# Patient Record
Sex: MALE | Race: WHITE | NOT HISPANIC OR LATINO | Employment: FULL TIME | URBAN - NONMETROPOLITAN AREA
[De-identification: names, ages, dates, MRNs, and addresses within clinical notes are randomized per-mention and may not be internally consistent; named-entity substitution may affect disease eponyms.]

---

## 2019-04-26 ENCOUNTER — NON-PROVIDER VISIT (OUTPATIENT)
Dept: URGENT CARE | Facility: PHYSICIAN GROUP | Age: 34
End: 2019-04-26

## 2019-04-26 DIAGNOSIS — Z02.1 PRE-EMPLOYMENT DRUG SCREENING: ICD-10-CM

## 2019-04-26 LAB
AMP AMPHETAMINE: NORMAL
COC COCAINE: NORMAL
INT CON NEG: NEGATIVE
INT CON POS: POSITIVE
MET METHAMPHETAMINES: NORMAL
OPI OPIATES: NORMAL
PCP PHENCYCLIDINE: NORMAL
POC DRUG COMMENT 753798-OCCUPATIONAL HEALTH: NEGATIVE
THC: NORMAL

## 2019-04-26 PROCEDURE — 80305 DRUG TEST PRSMV DIR OPT OBS: CPT | Performed by: PHYSICIAN ASSISTANT

## 2019-11-14 ENCOUNTER — NON-PROVIDER VISIT (OUTPATIENT)
Dept: URGENT CARE | Facility: PHYSICIAN GROUP | Age: 34
End: 2019-11-14

## 2019-11-14 DIAGNOSIS — Z02.1 PRE-EMPLOYMENT DRUG SCREENING: Primary | ICD-10-CM

## 2019-11-14 LAB
AMP AMPHETAMINE: NORMAL
BAR BARBITURATES: NORMAL
BREATH ALCOHOL COMMENT: NEGATIVE
BZO BENZODIAZEPINES: NORMAL
COC COCAINE: NORMAL
INT CON NEG: NORMAL
INT CON POS: NORMAL
MDMA ECSTASY: NORMAL
MET METHAMPHETAMINES: NORMAL
MTD METHADONE: NORMAL
OPI OPIATES: NORMAL
OXY OXYCODONE: NORMAL
PCP PHENCYCLIDINE: NORMAL
POC BREATHALIZER: 0 PERCENT (ref 0–0.01)
POC URINE DRUG SCREEN OCDRS: NORMAL
THC: NORMAL

## 2019-11-14 PROCEDURE — 82075 ASSAY OF BREATH ETHANOL: CPT | Performed by: PHYSICIAN ASSISTANT

## 2019-11-14 PROCEDURE — 80305 DRUG TEST PRSMV DIR OPT OBS: CPT | Performed by: PHYSICIAN ASSISTANT

## 2024-07-11 ENCOUNTER — HOSPITAL ENCOUNTER (EMERGENCY)
Facility: HOSPITAL | Age: 39
Discharge: HOME | End: 2024-07-11
Attending: EMERGENCY MEDICINE
Payer: COMMERCIAL

## 2024-07-11 VITALS
OXYGEN SATURATION: 99 % | HEART RATE: 74 BPM | RESPIRATION RATE: 18 BRPM | WEIGHT: 271 LBS | DIASTOLIC BLOOD PRESSURE: 86 MMHG | HEIGHT: 66 IN | SYSTOLIC BLOOD PRESSURE: 155 MMHG | TEMPERATURE: 98 F | BODY MASS INDEX: 43.55 KG/M2

## 2024-07-11 DIAGNOSIS — M79.604 LEG PAIN, ANTERIOR, RIGHT: ICD-10-CM

## 2024-07-11 DIAGNOSIS — R42 DIZZINESS: ICD-10-CM

## 2024-07-11 DIAGNOSIS — R53.83 FATIGUE, UNSPECIFIED TYPE: Primary | ICD-10-CM

## 2024-07-11 DIAGNOSIS — R06.02 SOB (SHORTNESS OF BREATH): ICD-10-CM

## 2024-07-11 DIAGNOSIS — N30.01 ACUTE CYSTITIS WITH HEMATURIA: ICD-10-CM

## 2024-07-11 LAB
ALBUMIN SERPL BCP-MCNC: 4.9 G/DL (ref 3.5–5.2)
ALP SERPL-CCNC: 43 U/L (ref 55–135)
ALT SERPL W/O P-5'-P-CCNC: 44 U/L (ref 10–44)
ANION GAP SERPL CALC-SCNC: 13 MMOL/L (ref 8–16)
AST SERPL-CCNC: 24 U/L (ref 10–40)
BACTERIA #/AREA URNS HPF: ABNORMAL /HPF
BASOPHILS # BLD AUTO: 0.04 K/UL (ref 0–0.2)
BASOPHILS NFR BLD: 0.5 % (ref 0–1.9)
BILIRUB SERPL-MCNC: 1.2 MG/DL (ref 0.1–1)
BILIRUB UR QL STRIP: NEGATIVE
BNP SERPL-MCNC: <10 PG/ML (ref 0–99)
BUN SERPL-MCNC: 19 MG/DL (ref 6–20)
CALCIUM SERPL-MCNC: 10 MG/DL (ref 8.7–10.5)
CHLORIDE SERPL-SCNC: 104 MMOL/L (ref 95–110)
CK SERPL-CCNC: 244 U/L (ref 20–200)
CLARITY UR: ABNORMAL
CO2 SERPL-SCNC: 20 MMOL/L (ref 23–29)
COLOR UR: YELLOW
CREAT SERPL-MCNC: 1.2 MG/DL (ref 0.5–1.4)
DIFFERENTIAL METHOD BLD: ABNORMAL
EOSINOPHIL # BLD AUTO: 0.1 K/UL (ref 0–0.5)
EOSINOPHIL NFR BLD: 1.3 % (ref 0–8)
ERYTHROCYTE [DISTWIDTH] IN BLOOD BY AUTOMATED COUNT: 12 % (ref 11.5–14.5)
EST. GFR  (NO RACE VARIABLE): >60 ML/MIN/1.73 M^2
GLUCOSE SERPL-MCNC: 99 MG/DL (ref 70–110)
GLUCOSE UR QL STRIP: NEGATIVE
HCT VFR BLD AUTO: 43.1 % (ref 40–54)
HGB BLD-MCNC: 15.7 G/DL (ref 14–18)
HGB UR QL STRIP: NEGATIVE
HYALINE CASTS #/AREA URNS LPF: 19 /LPF
IMM GRANULOCYTES # BLD AUTO: 0.02 K/UL (ref 0–0.04)
IMM GRANULOCYTES NFR BLD AUTO: 0.3 % (ref 0–0.5)
KETONES UR QL STRIP: ABNORMAL
LEUKOCYTE ESTERASE UR QL STRIP: NEGATIVE
LYMPHOCYTES # BLD AUTO: 2.1 K/UL (ref 1–4.8)
LYMPHOCYTES NFR BLD: 27.4 % (ref 18–48)
MAGNESIUM SERPL-MCNC: 2.1 MG/DL (ref 1.6–2.6)
MCH RBC QN AUTO: 30.4 PG (ref 27–31)
MCHC RBC AUTO-ENTMCNC: 36.4 G/DL (ref 32–36)
MCV RBC AUTO: 83 FL (ref 82–98)
MICROSCOPIC COMMENT: ABNORMAL
MONOCYTES # BLD AUTO: 0.4 K/UL (ref 0.3–1)
MONOCYTES NFR BLD: 5 % (ref 4–15)
NEUTROPHILS # BLD AUTO: 5.1 K/UL (ref 1.8–7.7)
NEUTROPHILS NFR BLD: 65.5 % (ref 38–73)
NITRITE UR QL STRIP: NEGATIVE
NRBC BLD-RTO: 0 /100 WBC
PH UR STRIP: 6 [PH] (ref 5–8)
PLATELET # BLD AUTO: 309 K/UL (ref 150–450)
PMV BLD AUTO: 9.5 FL (ref 9.2–12.9)
POCT GLUCOSE: 107 MG/DL (ref 70–110)
POCT GLUCOSE: 99 MG/DL (ref 70–110)
POTASSIUM SERPL-SCNC: 3.9 MMOL/L (ref 3.5–5.1)
PROT SERPL-MCNC: 8.5 G/DL (ref 6–8.4)
PROT UR QL STRIP: ABNORMAL
RBC # BLD AUTO: 5.17 M/UL (ref 4.6–6.2)
RBC #/AREA URNS HPF: 5 /HPF (ref 0–4)
SODIUM SERPL-SCNC: 137 MMOL/L (ref 136–145)
SP GR UR STRIP: 1.03 (ref 1–1.03)
TROPONIN I SERPL DL<=0.01 NG/ML-MCNC: 0.01 NG/ML (ref 0–0.03)
URN SPEC COLLECT METH UR: ABNORMAL
UROBILINOGEN UR STRIP-ACNC: NEGATIVE EU/DL
WBC # BLD AUTO: 7.75 K/UL (ref 3.9–12.7)
WBC #/AREA URNS HPF: 7 /HPF (ref 0–5)

## 2024-07-11 PROCEDURE — 99285 EMERGENCY DEPT VISIT HI MDM: CPT | Mod: 25

## 2024-07-11 PROCEDURE — 63600175 PHARM REV CODE 636 W HCPCS

## 2024-07-11 PROCEDURE — 83036 HEMOGLOBIN GLYCOSYLATED A1C: CPT | Performed by: PHYSICIAN ASSISTANT

## 2024-07-11 PROCEDURE — 82550 ASSAY OF CK (CPK): CPT | Performed by: PHYSICIAN ASSISTANT

## 2024-07-11 PROCEDURE — 85025 COMPLETE CBC W/AUTO DIFF WBC: CPT | Performed by: PHYSICIAN ASSISTANT

## 2024-07-11 PROCEDURE — 83735 ASSAY OF MAGNESIUM: CPT | Performed by: PHYSICIAN ASSISTANT

## 2024-07-11 PROCEDURE — 80053 COMPREHEN METABOLIC PANEL: CPT | Performed by: PHYSICIAN ASSISTANT

## 2024-07-11 PROCEDURE — 96361 HYDRATE IV INFUSION ADD-ON: CPT

## 2024-07-11 PROCEDURE — 96374 THER/PROPH/DIAG INJ IV PUSH: CPT

## 2024-07-11 PROCEDURE — 82962 GLUCOSE BLOOD TEST: CPT

## 2024-07-11 PROCEDURE — 93005 ELECTROCARDIOGRAM TRACING: CPT

## 2024-07-11 PROCEDURE — 83880 ASSAY OF NATRIURETIC PEPTIDE: CPT | Performed by: PHYSICIAN ASSISTANT

## 2024-07-11 PROCEDURE — 84484 ASSAY OF TROPONIN QUANT: CPT | Performed by: PHYSICIAN ASSISTANT

## 2024-07-11 PROCEDURE — 81000 URINALYSIS NONAUTO W/SCOPE: CPT | Performed by: PHYSICIAN ASSISTANT

## 2024-07-11 PROCEDURE — 93010 ELECTROCARDIOGRAM REPORT: CPT | Mod: ,,, | Performed by: INTERNAL MEDICINE

## 2024-07-11 PROCEDURE — 25000003 PHARM REV CODE 250

## 2024-07-11 RX ORDER — ONDANSETRON 4 MG/1
4 TABLET, ORALLY DISINTEGRATING ORAL EVERY 8 HOURS PRN
Qty: 15 TABLET | Refills: 0 | Status: SHIPPED | OUTPATIENT
Start: 2024-07-11

## 2024-07-11 RX ORDER — CEPHALEXIN 500 MG/1
500 CAPSULE ORAL 4 TIMES DAILY
Qty: 28 CAPSULE | Refills: 0 | Status: SHIPPED | OUTPATIENT
Start: 2024-07-11 | End: 2024-07-18

## 2024-07-11 RX ORDER — DICLOFENAC SODIUM 75 MG/1
1 TABLET, DELAYED RELEASE ORAL 2 TIMES DAILY
COMMUNITY
Start: 2024-07-10 | End: 2025-07-10

## 2024-07-11 RX ORDER — ONDANSETRON HYDROCHLORIDE 2 MG/ML
4 INJECTION, SOLUTION INTRAVENOUS
Status: COMPLETED | OUTPATIENT
Start: 2024-07-11 | End: 2024-07-11

## 2024-07-11 RX ADMIN — SODIUM CHLORIDE 1000 ML: 9 INJECTION, SOLUTION INTRAVENOUS at 04:07

## 2024-07-11 RX ADMIN — ONDANSETRON 4 MG: 2 INJECTION INTRAMUSCULAR; INTRAVENOUS at 04:07

## 2024-07-11 NOTE — ED TRIAGE NOTES
"Pt to ED for evaluation of near syncopal episode that occurred earlier today while at work. Pt reports that he has had several episodes of dizziness but "never this bad". Pt reports that he has been seen and assessed for symptoms several time in the last month "and everything comes back fine". Pt also c/o headaches and nausea that has since resolved. No Pmhx. Pt is AAOx4, ambulatory w/ a steady gait and NADN.   "

## 2024-07-11 NOTE — Clinical Note
"Sonido Brownlatha Post was seen and treated in our emergency department on 7/11/2024.  He may return to work on 07/15/2024.  May return sooner if symptoms resolving or otherwise stated by PCP     If you have any questions or concerns, please don't hesitate to call.      Brigette Saha PA-C"

## 2024-07-11 NOTE — DISCHARGE INSTRUCTIONS
Be sure to complete all antibiotics as prescribed.  Be sure urinating adequate rest and drink plenty of fluids to stay hydrated.    Follow up at your scheduled in primary care and cardiology appointment tomorrow.    Please return to ER if you experience severe dizziness, fever higher then 100.4 that persist after medication administration, uncontrolled nausea/vomiting or diarrhea or any other major concern like increased pain, chest pain, shortness of breath, inability to pass stool or gas, or difficulty breathing or swelling of the throat/mouth/tongue.    Be sure to follow up with your primary care doctor and review all labs/imaging/tests that were performed during your ER visit with them. It is very common for us to identify non-emergent incidental findings which must be followed up with your primary care physician.  Some labs/imaging/tests may be outside of the normal range, and require non-emergent follow-up and/or further investigation/treatment/procedures/testing to help diagnose/exclude/prevent complications or other potentially serious medical conditions. Some abnormalities may not have been discussed or addressed during your ER visit.

## 2024-07-11 NOTE — ED PROVIDER NOTES
"Encounter Date: 7/11/2024    SCRIBE #1 NOTE: I, Liberty Owenes, am scribing for, and in the presence of,  Brigette Saha PA-C. I have scribed the following portions of the note - Other sections scribed: HPI, ROS, PE.       History     Chief Complaint   Patient presents with    Dizziness     Pt working in heat c/o lightheadedness, dizziness, SOB and headaches. Pt states "I felt like I was catching a heat stroke" Pt AAO x4 at present. RR even and unlabored.      39 y.o. male with PMHx of diabetes mellitus type 2, presents for emergent evaluation of near syncopal episode that occurred while at work earlier today. Patient reports he has been experiencing headaches, dizziness, lightheadedness, and fatigue for the past 4-5 days with symptoms abruptly worsened today while were hand side prompting him to report to the ER. Patient also endorses having SOB, palpitations, and a tingling sensation throughout his chest and bilateral upper extremities at that time but is now resolving. Currently, patient reports intermittent nausea but denies any emesis, diarrhea or decreased urine output. Patient has not taken any medications at this time. Patient states he used to take Metformin but stopped on his own accord a few years ago after making diet changes and losing weight.  [place] has not followed up with a PCP.  No other daily medications. Patient reports history of drug use (cocaine, quit years ago) and marijuana (quit 4 months ago). Patient denies fever/chills, abdominal pain, vomiting/diarrhea, urinary concerns or any other complaints at this time.    Patient reports history of similar symptoms in the past but "never this bad". Patient reports having similar symptoms a month ago that began while working outside in the heat. Per chart review, patient [facility] for elevated blood pressure, palpitations, shortness of breath, nausea, confusion. EKG demonstrated no ischemia or arrhythmia. " Chest x-ray was unremarkable. Troponins were  x 2. CMP with minimally elevated glucose and exceedingly mild hypokalemia. CBC was normal. Patient was discharged home and advised for a followup with his PCP to monitor blood pressure. Patient states since his first ED visit, he did not experience similar symptoms until 5 days ago. He also reports a mass to his right shin that occurred after his leg was caught on the bumper 1 week ago but has since noticed increased swelling to the area.  Patient was seen at [facility] yesterday where he received in x-ray without any abnormalities was advised to follow up with ortho.  He is unsure if this injury may be contributing to his current symptoms.  Does report follow up with Cardiology tomorrow.    The history is provided by the patient and medical records. No  was used.     Review of patient's allergies indicates:  No Known Allergies  No past medical history on file.  No past surgical history on file.  No family history on file.     Review of Systems   Constitutional:  Positive for fatigue. Negative for chills and fever.   HENT:  Negative for sore throat.    Eyes:  Negative for photophobia, pain and visual disturbance.   Respiratory:  Positive for shortness of breath.    Cardiovascular:  Positive for palpitations and leg swelling (R shin). Negative for chest pain.   Gastrointestinal:  Positive for nausea. Negative for abdominal pain, diarrhea and vomiting.   Genitourinary:  Negative for decreased urine volume, dysuria, hematuria and testicular pain.   Musculoskeletal:  Negative for myalgias.   Skin:  Negative for rash.   Neurological:  Positive for dizziness, light-headedness, numbness (tingling to chest and bilateral upper extremities) and headaches. Negative for syncope and weakness.        (+) near syncope   Psychiatric/Behavioral:  Positive for decreased concentration.        Physical Exam     Initial Vitals [07/11/24 1453]   BP Pulse Resp Temp SpO2   (!)  155/86 74 18 98.2 °F (36.8 °C) 99 %      MAP       --         Physical Exam    Nursing note and vitals reviewed.  Constitutional: He appears well-developed and well-nourished. He is not diaphoretic. No distress.   HENT:   Head: Normocephalic and atraumatic.   Right Ear: External ear normal.   Left Ear: External ear normal.   Mouth/Throat: Oropharynx is clear and moist.   Eyes: Conjunctivae and EOM are normal. Pupils are equal, round, and reactive to light. No scleral icterus.   Neck: Neck supple.   Normal range of motion.  Cardiovascular:  Normal rate, regular rhythm and normal heart sounds.           Pulmonary/Chest: Breath sounds normal. No stridor. No respiratory distress. He has no wheezes. He has no rhonchi. He has no rales.   Abdominal: Abdomen is soft. Bowel sounds are normal. He exhibits no distension. There is no abdominal tenderness.   No suprapubic tenderness, no flank pain, no CVAT  There is no rebound and no guarding.   Musculoskeletal:         General: Normal range of motion.      Cervical back: Normal range of motion and neck supple.      Right lower leg: No tenderness (R inner calf).      Comments: 5/5 strength in all extremities with no decrease in sensation.  Patient has full range of motion of all extremities.  2+ radial pulses.  2+ dorsalis pedis pulse.  2+ posterior tibial pulse.  Ambulating without difficulty. 4 cm palpable mass noted to medial aspect of right lower extremity that is nontender to palpation.  No bruising, wounds, skin abnormalities.  Distal sensation intact.  2+ pedal pulse.  Strong pulses on Doppler.      Lymphadenopathy:     He has no cervical adenopathy.   Neurological: He is alert and oriented to person, place, and time. He has normal strength. No cranial nerve deficit or sensory deficit. GCS score is 15. GCS eye subscore is 4. GCS verbal subscore is 5. GCS motor subscore is 6.   PERRL, EOMI w/out evidence of nystagmus, No deficits appreciated to light touch bilateral face,  No facial weakness, no facial asymmetry. Eyebrow raise symmetric. Smile symmetric, Palate midline, and raises symmetrically, Shoulder shrug 5/5 bilaterally, tongue is midline w/out asymmetry. Strength 5/5 to bilateral upper and lower extremities, sensation intact to light touch     Skin: Skin is warm. Abrasion noted. No rash noted.   Well-healed abrasion to R inner calf with palpable mass. No tenderness with palpation.    Psychiatric: He has a normal mood and affect. Thought content normal.         ED Course   Procedures  Labs Reviewed   CBC W/ AUTO DIFFERENTIAL - Abnormal; Notable for the following components:       Result Value    MCHC 36.4 (*)     All other components within normal limits   COMPREHENSIVE METABOLIC PANEL - Abnormal; Notable for the following components:    CO2 20 (*)     Total Protein 8.5 (*)     Total Bilirubin 1.2 (*)     Alkaline Phosphatase 43 (*)     All other components within normal limits   CK - Abnormal; Notable for the following components:     (*)     All other components within normal limits   URINALYSIS, REFLEX TO URINE CULTURE - Abnormal; Notable for the following components:    Appearance, UA Hazy (*)     Protein, UA 1+ (*)     Ketones, UA 1+ (*)     All other components within normal limits    Narrative:     Specimen Source->Urine   URINALYSIS MICROSCOPIC - Abnormal; Notable for the following components:    RBC, UA 5 (*)     WBC, UA 7 (*)     Hyaline Casts, UA 19 (*)     All other components within normal limits    Narrative:     Specimen Source->Urine   B-TYPE NATRIURETIC PEPTIDE   TROPONIN I   HEMOGLOBIN A1C   MAGNESIUM   HEMOGLOBIN A1C   MAGNESIUM   TROPONIN I   POCT GLUCOSE   POCT GLUCOSE        ECG Results              EKG 12-lead (Final result)  Result time 07/12/24 11:10:09      Final result by Unknown User (07/12/24 11:10:09)                                      Imaging Results              US Lower Extremity Veins Right (Final result)  Result time 07/11/24 18:02:38       Final result by Jassi Frederick MD (07/11/24 18:02:38)                   Impression:      No evidence of right lower extremity deep venous thrombosis.      Electronically signed by: Jassi Frederick MD  Date:    07/11/2024  Time:    18:02               Narrative:    EXAMINATION:  US LOWER EXTREMITY VEINS RIGHT    CLINICAL HISTORY:  Pain in unspecified lower leg    TECHNIQUE:  Duplex and color flow Doppler evaluation of the right lower extremity veins was performed.    COMPARISON:  None    FINDINGS:  No evidence of clot involving the bilateral common femoral veins or right greater saphenous, femoral, popliteal, peroneal, anterior and posterior tibial veins.  All venous structures demonstrate normal respiratory phasicity and augment adequately.  No evidence of soft tissue mass or Baker's cyst.                                       X-Ray Tibia Fibula 2 View Right (Final result)  Result time 07/11/24 17:07:13      Final result by Henry Gómez MD (07/11/24 17:07:13)                   Impression:      No acute radiographic abnormality.      Electronically signed by: Henry Gómez  Date:    07/11/2024  Time:    17:07               Narrative:    EXAMINATION:  XR TIBIA FIBULA 2 VIEW RIGHT    CLINICAL HISTORY:  Injury, unspecified, initial encounter    TECHNIQUE:  AP and lateral views of the right tibia and fibula were performed.    COMPARISON:  None.    FINDINGS:  No acute fracture, subluxation or dislocation.  No mass or foreign body.  No significant arthropathy.                                       X-Ray Chest PA And Lateral (Final result)  Result time 07/11/24 15:37:46      Final result by Collin Walker MD (07/11/24 15:37:46)                   Impression:      No acute cardiopulmonary abnormality.      Electronically signed by: Collin Walker  Date:    07/11/2024  Time:    15:37               Narrative:    EXAMINATION:  XR CHEST PA AND LATERAL    CLINICAL HISTORY:  Shortness of  breath    TECHNIQUE:  PA and lateral views of the chest were performed.    COMPARISON:  None    FINDINGS:  Cardiomediastinal silhouette within normal limits.  No confluent airspace opacity or lobar consolidation.  No pleural effusion or gross pneumothorax.  No acute osseous abnormality                                       Medications   sodium chloride 0.9% bolus 1,000 mL 1,000 mL (0 mLs Intravenous Stopped 7/11/24 1740)   ondansetron injection 4 mg (4 mg Intravenous Given 7/11/24 1645)     Medical Decision Making  This is an evaluation of a 39 y.o. male that presents to the Emergency Department for near syncopal episode that occurred while at work earlier today. Patient reports he has been experiencing headaches, dizziness, lightheadedness, and fatigue for the past 4-5 days.  He reports similar episode 1 month ago and was evaluated in the ER with unremarkable workup.  Follow up with Cardiology scheduled for tomorrow.    Vitals stable. Nontoxic appearing.  No respiratory distress.    See above for full physical exam findings.    Patient's chart and medical history reviewed.  reviewed.    Differential Diagnosis includes, but is not limited to:  Arrhythmia, aortic dissection, MI/unstable angina, PE, cardiogenic shock, CHF, CVA/TIA, intracranial lesion/mass, seizure, perforated viscous, ruptured AAA, orthostatic hypotension, vasovagal episode, anemia, dehydration, medication reaction, intentional overdose     ED MEDS GIVEN:  Medications  sodium chloride 0.9% bolus 1,000 mL 1,000 mL (0 mLs Intravenous Stopped 7/11/24 1740)  ondansetron injection 4 mg (4 mg Intravenous Given 7/11/24 1645)    Clinical Impression:  Final diagnoses:  [R06.02] SOB (shortness of breath)  [R42] Dizziness  [M79.604] Leg pain, anterior, right  [N30.01] Acute cystitis with hematuria  [R53.83] Fatigue, unspecified type (Primary)    Clinical picture today most consistent with the impression above.  See ED course.  EKG unremarkable for any  ischemic changes.  No leukocytosis. H/H stable.  No notable electrolyte abnormalities.  UA with 7+ white blood cells and rare bacteria.  Patient is not circumcised so this could be just contamination.  Urine culture pending.  Mild elevation CPK but no indication of rhabdomyolysis.  No elevations in troponin or BNP.  U/S lower extremity patent with no evidence of DVT.  Patient states he is feeling much better after IV rehydration and Zofran.  Repeat abdominal exam benign.  Tolerating PO without difficulty.  Neuro intact.  Ambulating without difficulty.    We will discharge with short course of antibiotics while urine culture pending.  Emphasized the importance of adequate rest and oral hydration.  I see no indication of an emergent process beyond that addressed during our encounter but have duly counseled the patient/family regarding the need for prompt follow-up as well as the indications that should prompt immediate return to the emergency room should new or worrisome developments occur.  The patient/family has been provided with verbal and printed direction regarding our final diagnosis(es) as well as instructions regarding use of OTC and/or Rx medications intended to manage the patient's conditions. The patient/family communicates understanding of all this information and all remaining questions and concerns were addressed at this time.    DISCLAIMER: This note was prepared with [employer]ijector voice recognition transcription software. Garbled syntax, mangled pronouns, and other bizarre constructions may be attributed to that software system.     Amount and/or Complexity of Data Reviewed  External Data Reviewed: labs, radiology and notes.     Details: See HPI.   Labs: ordered. Decision-making details documented in ED Course.  Radiology: ordered. Decision-making details documented in ED Course.  ECG/medicine tests: ordered. Decision-making details documented in ED Course.    Risk  OTC drugs.  Prescription drug  management.  Diagnosis or treatment significantly limited by social determinants of health.            Scribe Attestation:   Scribe #1: I performed the above scribed service and the documentation accurately describes the services I performed. I attest to the accuracy of the note.        ED Course as of 07/12/24 2028   Thu Jul 11, 2024   1501 EKG 12-lead  No STEMI. [LP]   1728 POCT Glucose: 99 [CC]   1728 CBC auto differential(!)  CBC without significant anemia, thrombocytopenia, or leukocytosis. [CC]   1728 Comprehensive metabolic panel(!)  CMP unremarkable without significant electrolyte derangement, impaired renal function, or elevated LFTs.  [CC]   1729 BILIRUBIN TOTAL(!): 1.2 [CC]   1729 CPK(!): 244  Rhabdomyolysis unlikely [CC]   1729 BNP: <10 [CC]   1729 Magnesium : 2.1 [CC]   1729 Troponin I: 0.008 [CC]   1729 Urinalysis Microscopic(!) [CC]   1729 WBC, UA(!): 7  Patient not circumcised.  Could be contamination [CC]   1729 RBC, UA(!): 5  Likely secondary to dehydration [CC]   1730 X-Ray Chest PA And Lateral  Chest x-ray was unremarkable per my personal interpretation. No lobar consolidation, large pneumothorax, or mediastinal widening.  See note for official interpretation per radiologist. [CC]   1731 X-Ray Tibia Fibula 2 View Right  No acute fracture, subluxation or dislocation.  No mass or foreign body.  No significant arthropathy. [CC]   1812 US Lower Extremity Veins Right  No evidence of clot involving the bilateral common femoral veins or right greater saphenous, femoral, popliteal, peroneal, anterior and posterior tibial veins. [CC]      ED Course User Index  [CC] Brigette Saha PA-C  [LP] Se Arauz III, MD                         I, Brigette Saha PA-C, personally performed the services described in this documentation. All medical record entries made by the scribe were at my direction and in my presence. I have reviewed the chart and agree that the record reflects my personal performance and  is accurate and complete.    Clinical Impression:  Final diagnoses:  [R06.02] SOB (shortness of breath)  [R42] Dizziness  [M79.604] Leg pain, anterior, right  [N30.01] Acute cystitis with hematuria  [R53.83] Fatigue, unspecified type (Primary)          ED Disposition Condition    Discharge Stable          ED Prescriptions       Medication Sig Dispense Start Date End Date Auth. Provider    cephALEXin (KEFLEX) 500 MG capsule Take 1 capsule (500 mg total) by mouth 4 (four) times daily. for 7 days 28 capsule 7/11/2024 7/18/2024 Brigette Saha PA-C    ondansetron (ZOFRAN-ODT) 4 MG TbDL Take 1 tablet (4 mg total) by mouth every 8 (eight) hours as needed (nausea). 15 tablet 7/11/2024 -- Brigette Saha PA-C          Follow-up Information       Follow up With Specialties Details Why Contact Info    [facility] - Emergency Dept Emergency Medicine Go to  For new or worsening symptoms [address] [facility] [place] [id number] [phone]    [facility] -  Schedule an appointment as soon as possible for a visit   [address] [place] [address]  [phone]               Brigette Saha PA-C  07/12/24 2028

## 2024-07-12 LAB
ESTIMATED AVG GLUCOSE: 114 MG/DL (ref 68–131)
HBA1C MFR BLD: 5.6 % (ref 4–5.6)
OHS QRS DURATION: 96 MS
OHS QTC CALCULATION: 409 MS

## 2024-12-02 ENCOUNTER — OFFICE VISIT (OUTPATIENT)
Dept: FAMILY MEDICINE | Facility: CLINIC | Age: 39
End: 2024-12-02
Payer: COMMERCIAL

## 2024-12-02 VITALS
OXYGEN SATURATION: 97 % | TEMPERATURE: 98 F | HEART RATE: 77 BPM | DIASTOLIC BLOOD PRESSURE: 75 MMHG | WEIGHT: 277.75 LBS | SYSTOLIC BLOOD PRESSURE: 130 MMHG | BODY MASS INDEX: 44.64 KG/M2 | HEIGHT: 66 IN

## 2024-12-02 DIAGNOSIS — M54.50 ACUTE LOW BACK PAIN WITHOUT SCIATICA, UNSPECIFIED BACK PAIN LATERALITY: ICD-10-CM

## 2024-12-02 DIAGNOSIS — M54.2 NECK PAIN: ICD-10-CM

## 2024-12-02 DIAGNOSIS — E11.9 DIET-CONTROLLED TYPE 2 DIABETES MELLITUS: ICD-10-CM

## 2024-12-02 DIAGNOSIS — K92.1 BLOOD IN STOOL: ICD-10-CM

## 2024-12-02 DIAGNOSIS — R10.13 EPIGASTRIC PAIN: Primary | ICD-10-CM

## 2024-12-02 PROCEDURE — 99204 OFFICE O/P NEW MOD 45 MIN: CPT | Mod: S$GLB,,, | Performed by: INTERNAL MEDICINE

## 2024-12-02 PROCEDURE — 3044F HG A1C LEVEL LT 7.0%: CPT | Mod: CPTII,S$GLB,, | Performed by: INTERNAL MEDICINE

## 2024-12-02 PROCEDURE — 3075F SYST BP GE 130 - 139MM HG: CPT | Mod: CPTII,S$GLB,, | Performed by: INTERNAL MEDICINE

## 2024-12-02 PROCEDURE — 1160F RVW MEDS BY RX/DR IN RCRD: CPT | Mod: CPTII,S$GLB,, | Performed by: INTERNAL MEDICINE

## 2024-12-02 PROCEDURE — 3008F BODY MASS INDEX DOCD: CPT | Mod: CPTII,S$GLB,, | Performed by: INTERNAL MEDICINE

## 2024-12-02 PROCEDURE — G2211 COMPLEX E/M VISIT ADD ON: HCPCS | Mod: S$GLB,,, | Performed by: INTERNAL MEDICINE

## 2024-12-02 PROCEDURE — 99999 PR PBB SHADOW E&M-EST. PATIENT-LVL V: CPT | Mod: PBBFAC,,, | Performed by: INTERNAL MEDICINE

## 2024-12-02 PROCEDURE — 3078F DIAST BP <80 MM HG: CPT | Mod: CPTII,S$GLB,, | Performed by: INTERNAL MEDICINE

## 2024-12-02 PROCEDURE — 1159F MED LIST DOCD IN RCRD: CPT | Mod: CPTII,S$GLB,, | Performed by: INTERNAL MEDICINE

## 2024-12-02 NOTE — PROGRESS NOTES
SUBJECTIVE     Chief Complaint   Patient presents with    Abdominal Pain    GI Problem    Rectal Bleeding       HPI  Sonido Post is a 39 y.o. male with multiple medical diagnoses as listed in the medical history and problem list that presents for evaluation of abdominal pain x 4-5 months. Pt reports developing heart palpitations and got a little tired after. He then developed a tingling sensation over his upper torso and he got pale. Pt presented to the ED and followed up with Cardiology without any known findings. It has continued with several bouts since then without any known cause. Pt then went on a diet and lost a bunch of weight and his symptoms resolved. He has now fallen off and is back to eating junk and noted some bright red chunks of blood in his stools. He has quit drinking Dr. Burger and now has some noted constipation. +abdominal upper pain discomfort, low to mid-back pain that feels like it is in the center of his spine, dark stools, gassy, and feels sick. Denies any fever or chills, but he has night sweats on occasion. Pt has not taken any meds for his symptoms.     PAST MEDICAL HISTORY:  History reviewed. No pertinent past medical history.    PAST SURGICAL HISTORY:  Past Surgical History:   Procedure Laterality Date    TONSILLECTOMY  2012       SOCIAL HISTORY:  Social History     Socioeconomic History    Marital status:    Tobacco Use    Smoking status: Never     Passive exposure: Never    Smokeless tobacco: Never       FAMILY HISTORY:  No family history on file.    ALLERGIES AND MEDICATIONS: updated and reviewed.  Review of patient's allergies indicates:  No Known Allergies  No current outpatient medications on file.     No current facility-administered medications for this visit.       ROS  Review of Systems   Constitutional:  Negative for chills and fever.   HENT:  Negative for hearing loss and sore throat.    Eyes:  Negative for visual disturbance.   Respiratory:  Negative for cough  "and shortness of breath.    Cardiovascular:  Negative for chest pain, palpitations and leg swelling.   Gastrointestinal:  Positive for abdominal pain, blood in stool and constipation. Negative for diarrhea, nausea and vomiting.   Genitourinary:  Negative for dysuria, frequency and urgency.   Musculoskeletal:  Positive for back pain and neck pain. Negative for arthralgias, joint swelling and myalgias.   Skin:  Negative for rash and wound.   Neurological:  Negative for headaches.   Psychiatric/Behavioral:  Negative for agitation and confusion. The patient is not nervous/anxious.          OBJECTIVE     Physical Exam  Vitals:    12/02/24 1437   BP: 130/75   Pulse:    Temp:     Body mass index is 44.83 kg/m².  Weight: 126 kg (277 lb 12.5 oz)   Height: 5' 6" (167.6 cm)     Physical Exam  Constitutional:       General: He is not in acute distress.     Appearance: He is well-developed.   HENT:      Head: Normocephalic and atraumatic.      Right Ear: External ear normal.      Left Ear: External ear normal.      Nose: Nose normal.   Eyes:      General: No scleral icterus.        Right eye: No discharge.         Left eye: No discharge.      Conjunctiva/sclera: Conjunctivae normal.   Neck:      Vascular: No JVD.      Trachea: No tracheal deviation.   Cardiovascular:      Rate and Rhythm: Normal rate and regular rhythm.      Heart sounds: Normal heart sounds. No murmur heard.     No friction rub. No gallop.   Pulmonary:      Effort: Pulmonary effort is normal. No respiratory distress.      Breath sounds: Normal breath sounds. No wheezing.   Abdominal:      General: Bowel sounds are normal. There is no distension.      Palpations: Abdomen is soft. There is no mass.      Tenderness: There is no abdominal tenderness. There is no guarding or rebound.   Musculoskeletal:         General: No tenderness or deformity. Normal range of motion.      Cervical back: Normal range of motion and neck supple.   Skin:     General: Skin is warm and " dry.      Findings: No erythema or rash.   Neurological:      Mental Status: He is alert and oriented to person, place, and time.      Motor: No abnormal muscle tone.      Coordination: Coordination normal.   Psychiatric:         Behavior: Behavior normal.         Thought Content: Thought content normal.         Judgment: Judgment normal.           Health Maintenance         Date Due Completion Date    Hepatitis C Screening Never done ---    Lipid Panel Never done ---    HIV Screening Never done ---    TETANUS VACCINE Never done ---    Influenza Vaccine (1) Never done ---    COVID-19 Vaccine (1 - 2024-25 season) Never done ---    RSV Vaccine (Age 60+ and Pregnant patients) (1 - 1-dose 75+ series) 05/21/2059 ---              ASSESSMENT     39 y.o. male with     1. Epigastric pain    2. Acute low back pain without sciatica, unspecified back pain laterality    3. Blood in stool    4. Diet-controlled type 2 diabetes mellitus    5. Neck pain    6. BMI 40.0-44.9, adult        PLAN:     1. Epigastric pain  - Pt needs further eval  - CBC Auto Differential; Future  - Comprehensive Metabolic Panel; Future  - Hepatitis Panel, Acute; Future  - Lipase; Future  - Amylase; Future  - CT Abdomen Pelvis With IV Contrast Routine Oral Contrast; Future    2. Acute low back pain without sciatica, unspecified back pain laterality  - As above    3. Blood in stool  - recommend patient see GI immediately, but you will be returning back indefinitely on Friday so advised him to see his PCP for a GI referral STAT and he voiced understanding  - CBC Auto Differential; Future    4. Diet-controlled type 2 diabetes mellitus  - Comprehensive Metabolic Panel; Future  - Hemoglobin A1C; Future  - TSH; Future  - Lipid Panel; Future    5. Neck pain  - X-Ray Cervical Spine AP And Lateral; Future    6. BMI 40.0-44.9, adult  - patient aware of importance of eating a prudent diet and exercising        RTC in 1-2 weeks as needed for failure to improve, but go  straight to the ED for any acute worsening of current condition        Reshma Vega MD  12/02/2024 2:39 PM        No follow-ups on file.

## 2024-12-03 ENCOUNTER — HOSPITAL ENCOUNTER (OUTPATIENT)
Dept: RADIOLOGY | Facility: HOSPITAL | Age: 39
Discharge: HOME | End: 2024-12-03
Attending: INTERNAL MEDICINE
Payer: COMMERCIAL

## 2024-12-03 DIAGNOSIS — R10.13 EPIGASTRIC PAIN: ICD-10-CM

## 2024-12-03 PROBLEM — R16.0 HEPATOMEGALY: Status: ACTIVE | Noted: 2024-12-03

## 2024-12-03 PROBLEM — K76.0 HEPATIC STEATOSIS: Status: ACTIVE | Noted: 2024-12-03

## 2024-12-03 PROCEDURE — 74177 CT ABD & PELVIS W/CONTRAST: CPT | Mod: 26,,, | Performed by: RADIOLOGY

## 2024-12-03 PROCEDURE — 74177 CT ABD & PELVIS W/CONTRAST: CPT | Mod: TC

## 2024-12-03 PROCEDURE — 25500020 PHARM REV CODE 255: Performed by: INTERNAL MEDICINE

## 2024-12-03 RX ADMIN — IOHEXOL 15 ML: 300 INJECTION, SOLUTION INTRAVENOUS at 08:12

## 2024-12-03 RX ADMIN — IOHEXOL 100 ML: 350 INJECTION, SOLUTION INTRAVENOUS at 08:12

## 2024-12-04 ENCOUNTER — TELEPHONE (OUTPATIENT)
Dept: FAMILY MEDICINE | Facility: CLINIC | Age: 39
End: 2024-12-04
Payer: COMMERCIAL

## 2024-12-04 NOTE — TELEPHONE ENCOUNTER
Spoke w/ PT to notify of results per Dr Vega. He had questions about how to go about taking care of his liver until he goes back home to [place] to his PCP. Reminded him of Dr Vega' recommendations. Also, to do his own research.  PT verbalized understanding.

## 2024-12-04 NOTE — TELEPHONE ENCOUNTER
----- Message from Yefri sent at 12/4/2024  9:11 AM CST -----  Regarding: Sonido  Type:Patient Callback     Who called: Sonido     What is the request in detail: Patient stated that he would like to get the results to his test. Please reach out to the patient with those results as soon as possible.     Can the clinic reply by [facility]? Yes     Would the patient rather a call back or a response via [facility]? Callback     Best call back number: [phone]    Additional Information:

## 2024-12-04 NOTE — TELEPHONE ENCOUNTER
----- Message from [employer] sent at 12/4/2024  1:33 PM CST -----  Name of Who is Calling: SAYDA VILCHIS [[id number]]          What is the request in detail: Pt is requesting a call back regarding test results. Pt advised he have a question regarding a lab. Please assist.           Can the clinic reply by [facility]: No          What [facility]:  [phone]

## 2024-12-04 NOTE — TELEPHONE ENCOUNTER
Patient contacted and informed of result notes below. He verbalized understanding.     Your labs show controlled diabetes.  You also have high cholesterol, so you should be taking a daily cholesterol medicine.  The lab also suggested that your pancreas was possibly inflamed, but the CT scan does not show any inflammation of the pancreas.  All other labs are within acceptable range.  Please be sure to follow up with your primary care doctor for continued management of your diabetes and high cholesterol.   Written by Reshma Vega MD on 12/3/2024  5:23 PM CST     The CT scan does not show an immediate cause of your abdominal pain, but you do have an enlarged and fatty liver.  You have to follow up with your primary care doctor for further evaluation and possibly see a liver specialist for further eval.  Please decrease your intake of Tylenol and alcohol to help maintain a healthy liver.  You should also eat a more healthy diet and incorporate routine exercise.   Written by Reshma Vega MD on 12/3/2024  5:24 PM CST